# Patient Record
Sex: FEMALE | Race: OTHER | HISPANIC OR LATINO | URBAN - METROPOLITAN AREA
[De-identification: names, ages, dates, MRNs, and addresses within clinical notes are randomized per-mention and may not be internally consistent; named-entity substitution may affect disease eponyms.]

---

## 2022-08-01 ENCOUNTER — OUTPATIENT (OUTPATIENT)
Dept: OUTPATIENT SERVICES | Facility: HOSPITAL | Age: 52
LOS: 1 days | Discharge: HOME | End: 2022-08-01

## 2023-02-23 ENCOUNTER — EMERGENCY (EMERGENCY)
Facility: HOSPITAL | Age: 53
LOS: 0 days | Discharge: HOME | End: 2023-02-23
Attending: EMERGENCY MEDICINE
Payer: MEDICAID

## 2023-02-23 ENCOUNTER — OUTPATIENT (OUTPATIENT)
Dept: OUTPATIENT SERVICES | Facility: HOSPITAL | Age: 53
LOS: 1 days | End: 2023-02-23
Payer: MEDICAID

## 2023-02-23 VITALS
OXYGEN SATURATION: 96 % | HEART RATE: 105 BPM | TEMPERATURE: 98 F | DIASTOLIC BLOOD PRESSURE: 75 MMHG | WEIGHT: 169.98 LBS | SYSTOLIC BLOOD PRESSURE: 144 MMHG | RESPIRATION RATE: 20 BRPM

## 2023-02-23 DIAGNOSIS — Z32.02 ENCOUNTER FOR PREGNANCY TEST, RESULT NEGATIVE: ICD-10-CM

## 2023-02-23 DIAGNOSIS — K04.7 PERIAPICAL ABSCESS WITHOUT SINUS: ICD-10-CM

## 2023-02-23 DIAGNOSIS — R22.0 LOCALIZED SWELLING, MASS AND LUMP, HEAD: ICD-10-CM

## 2023-02-23 DIAGNOSIS — K02.9 DENTAL CARIES, UNSPECIFIED: ICD-10-CM

## 2023-02-23 DIAGNOSIS — Z91.013 ALLERGY TO SEAFOOD: ICD-10-CM

## 2023-02-23 LAB
APPEARANCE UR: ABNORMAL
BILIRUB UR-MCNC: NEGATIVE — SIGNIFICANT CHANGE UP
COLOR SPEC: YELLOW — SIGNIFICANT CHANGE UP
DIFF PNL FLD: ABNORMAL
GLUCOSE UR QL: NEGATIVE — SIGNIFICANT CHANGE UP
KETONES UR-MCNC: ABNORMAL
LEUKOCYTE ESTERASE UR-ACNC: ABNORMAL
NITRITE UR-MCNC: POSITIVE
PH UR: 6 — SIGNIFICANT CHANGE UP (ref 5–8)
PROT UR-MCNC: ABNORMAL
SP GR SPEC: 1.04 — HIGH (ref 1.01–1.03)
UROBILINOGEN FLD QL: ABNORMAL

## 2023-02-23 PROCEDURE — D0140: CPT

## 2023-02-23 PROCEDURE — D0230: CPT

## 2023-02-23 PROCEDURE — 41800 DRAINAGE OF GUM LESION: CPT

## 2023-02-23 PROCEDURE — D7140: CPT

## 2023-02-23 PROCEDURE — D0220: CPT

## 2023-02-23 PROCEDURE — 81001 URINALYSIS AUTO W/SCOPE: CPT

## 2023-02-23 PROCEDURE — 99284 EMERGENCY DEPT VISIT MOD MDM: CPT

## 2023-02-23 RX ORDER — AMOXICILLIN 250 MG/5ML
1 SUSPENSION, RECONSTITUTED, ORAL (ML) ORAL
Qty: 14 | Refills: 0
Start: 2023-02-23 | End: 2023-03-01

## 2023-02-23 NOTE — ED PROVIDER NOTE - NS ED ATTENDING STATEMENT MOD
This was a shared visit with the BEE. I reviewed and verified the documentation and independently performed the documented:

## 2023-02-23 NOTE — ED PROVIDER NOTE - OBJECTIVE STATEMENT
53 y/o female with a PMH of left face swelling that began last night, and requesting pregnancy test because she has not had her period in 2 months. pt denies fever, chills, recent trauma, dental pain, headache, dizziness, weakness, abdominal pain, n/v/d/c, or vaginal bleeding.

## 2023-02-23 NOTE — ED PROVIDER NOTE - CLINICAL SUMMARY MEDICAL DECISION MAKING FREE TEXT BOX
52-year-old female with toothache due to dental caries and dental abscess, patent airway.  Patient requested pregnancy test in ED which was done and negative, prescription for antibiotic was sent to her pharmacy, she was transferred to dental clinic for further evaluation and care.

## 2023-02-23 NOTE — ED ADULT NURSE NOTE - NSIMPLEMENTINTERV_GEN_ALL_ED
Implemented All Universal Safety Interventions:  [place] to call system. Call bell, personal items and telephone within reach. Instruct patient to call for assistance. Room bathroom lighting operational. Non-slip footwear when patient is off stretcher. Physically safe environment: no spills, clutter or unnecessary equipment. Stretcher in lowest position, wheels locked, appropriate side rails in place.

## 2023-02-23 NOTE — ED PROVIDER NOTE - ATTENDING APP SHARED VISIT CONTRIBUTION OF CARE
52-year-old female presenting to ED for evaluation of left facial swelling since last night.  In addition, she would like to get a pregnancy test, as well as menstrual period was 2 months ago. .  The patient states that she was seen by her dentist who told her that she will need tooth extraction but not before her infection clears.  They did not however prescribe any antibiotics.  She denies any difficulties breathing or swallowing, no fever chills or any other additional complaints.  Middle-aged female appears well and in no acute distress, PERRL, pink conjunctivae, MMM, normal floor of the mouth, normal phonation without drooling or trismus, airways patent, poor dentition with partially decayed tooth #24 with tenderness to palpation and palpable adjacent abscess, mild facial swelling over the left mandible, no facial cellulitis, supple neck, rest of exam unremarkable.  Prescription was sent to the patient's pharmacy, pregnancy test was done and negative, she was transferred to dental clinic for further evaluation and care.  She verbalized understanding and was amenable with the plan.

## 2023-02-23 NOTE — CONSULT NOTE ADULT - SUBJECTIVE AND OBJECTIVE BOX
Patient is a 52y old  Female who presents with a chief complaint of Pain and swelling mandibular left posterior    HPI:  Pain and swelling mandibular left posterior, abscess evident     PAST MEDICAL & SURGICAL HISTORY:    ( - ) heart valve replacement  ( - ) joint replacement  ( - ) pregnancy    MEDICATIONS  (STANDING):    MEDICATIONS  (PRN):      Allergies    No Known Drug Allergies  shellfish (Unknown)    Intolerances        FAMILY HISTORY:      *SOCIAL HISTORY: ( +  ) Tobacco; ( +  ) ETOH    *Last Dental Visit:    Vital Signs Last 24 Hrs  T(C): 36.4 (2023 11:56), Max: 36.4 (2023 11:56)  T(F): 97.5 (2023 11:56), Max: 97.5 (2023 11:56)  HR: 105 (2023 11:56) (105 - 105)  BP: 144/75 (2023 11:56) (144/75 - 144/75)  BP(mean): --  RR: 20 (2023 11:56) (20 - 20)  SpO2: 96% (2023 11:56) (96% - 96%)    Parameters below as of 2023 11:56  Patient On (Oxygen Delivery Method): room air        LABS:            Urinalysis Basic - ( 2023 12:40 )    Color: Yellow / Appearance: Slightly Turbid / S.036 / pH: x  Gluc: x / Ketone: Small  / Bili: Negative / Urobili: 3 mg/dL   Blood: x / Protein: 30 mg/dL / Nitrite: Positive   Leuk Esterase: Small / RBC: 7 /HPF / WBC 9 /HPF   Sq Epi: x / Non Sq Epi: 10 /HPF / Bacteria: Many          EOE:  TMJ ( - ) clicks                     ( - ) pops                     ( - ) crepitus             Mandible <<FROM>>             Facial bones and MOM <<grossly intact>>             ( - ) trismus             ( - ) lymphadenopathy             ( - ) swelling             ( - ) asymmetry             ( - ) palpation             ( - ) dyspnea             ( - ) dysphagia             ( - ) loss of consciousness    IOE:  <<permanent>> dentition: <<multiple carious teeth>> and<<multiple missing teeth>>           hard/soft palate: <<No pathology noted>>           tongue/FOM <<No pathology noted>>           labial/buccal mucosa <<No pathology noted>>           ( + ) percussion           ( - ) palpation           ( - ) swelling            ( - ) abscess           ( - ) sinus tract    Dentition present: <<y>>  Mobility: <<n>>  Caries: << y>>         *DENTAL RADIOGRAPHS: Periapical #21, #22    RADIOLOGY & ADDITIONAL STUDIES:    *ASSESSMENT: Nonrestorable tooth #21,22      *PLAN: Nonsurgical extraction #21, 22    PROCEDURE:   Verbal and written consent given.  Anesthesia: 1 carpule of 2% Lidocaine (1:100,000 epinephrine) via inferior alveolar nerve block; 1 carpule of 4% Septocaine (1:100,000 epinephrine) via local infiltration  Treatment: Treatment consequences explained as per OS sheet dated 00. Risks and benefits discussed. Consent obtained and side site completed. Administered anesthetic as described. Elevated and luxated tooth #21,22. Drained abscess. Curettaged and irrigated site with saline. Hemostasis achieved. Post-operative radiographs taken. Post-operative instructions given.     Ibuprofen 600 mg 1q6h PRN:Pain; Augmentin prescribed by ED    RECOMMENDATIONS:  1) Complete antiobiotic course as prescribed and analgesics as necessary for pain  2) Dental F/U with outpatient dentist for comprehensive dental care.   3) If any difficulty swallowing/breathing, fever occur, return to ER.     Resident Name:Patricio Collier, pager #[phone]

## 2023-02-23 NOTE — ED ADULT NURSE NOTE - NS ED PATIENT SAFETY CONCERN
GENERAL: No fever or chills  EYES: no change in vision  HEENT: no trouble swallowing or speaking  CARDIAC: no chest pain or palpitations   PULMONARY: no cough or SOB  GI: no abdominal pain, nausea, vomiting, diarrhea, or constipation   : No changes in urination  SKIN: no rashes  NEURO: no headache, numbness, or weakness.  MSK: see hpi
No

## 2023-02-23 NOTE — ED PROVIDER NOTE - PHYSICAL EXAMINATION
Physical Exam    Vital Signs: I have reviewed the initial vital signs.  Constitutional: well-nourished, appears stated age, no acute distress  Eyes: Conjunctiva pink, Sclera clear, PERRLA, EOMI without pain.   ENT: Oropharynx is clear without lesions. uvula midline. no tonsillar erythema, edema, or exudates. no stridor. no pta. floor of the mouth is soft without pus. (+) swelling to the left lower face with fullness, swelling, and tenderness to the gingiva surrounding tooth #24 without active drainage.   Cardiovascular: well-perfused extremities, radial pulses equal and 2+ b/l.   Respiratory: unlabored respiratory effort, pt is speaking full sentences. no accessory muscle use.   Musculoskeletal: FROM of b/l upper and lower extremities.   Integumentary: warm, dry, no rash  Neurologic: awake, alert, extremities’ motor and sensory functions grossly intact. steady gait.   Psychiatric: appropriate mood, appropriate affect

## 2023-05-17 DIAGNOSIS — K08.9 DISORDER OF TEETH AND SUPPORTING STRUCTURES, UNSPECIFIED: ICD-10-CM

## 2023-12-08 NOTE — ED ADULT TRIAGE NOTE - NS ED NURSE BANDS TYPE
From: [facility]  To: David Rubin  Sent: 12/4/2023 10:51 PM CST  Subject: Bone Density Scan    Dr. Radha Syed, I have been only been taking the calcium for a year. Not sure if that would affect my future results since I take the scan every 2 yrs. I am taking my Vit D and strength training with my weights. Name band;